# Patient Record
Sex: MALE | Race: BLACK OR AFRICAN AMERICAN | NOT HISPANIC OR LATINO | Employment: UNEMPLOYED | URBAN - METROPOLITAN AREA
[De-identification: names, ages, dates, MRNs, and addresses within clinical notes are randomized per-mention and may not be internally consistent; named-entity substitution may affect disease eponyms.]

---

## 2017-03-20 ENCOUNTER — HOSPITAL ENCOUNTER (EMERGENCY)
Facility: OTHER | Age: 42
Discharge: HOME | End: 2017-03-20
Attending: EMERGENCY MEDICINE

## 2017-03-20 VITALS
TEMPERATURE: 98 F | DIASTOLIC BLOOD PRESSURE: 65 MMHG | BODY MASS INDEX: 22.22 KG/M2 | SYSTOLIC BLOOD PRESSURE: 118 MMHG | RESPIRATION RATE: 16 BRPM | OXYGEN SATURATION: 99 % | HEART RATE: 89 BPM | WEIGHT: 150 LBS | HEIGHT: 69 IN

## 2017-03-20 DIAGNOSIS — Z20.2 POSSIBLE EXPOSURE TO STD: Primary | ICD-10-CM

## 2017-03-20 LAB — HIV1+2 IGG SERPL QL IA.RAPID: NEGATIVE

## 2017-03-20 PROCEDURE — 86701 HIV-1ANTIBODY: CPT

## 2017-03-20 PROCEDURE — 99283 EMERGENCY DEPT VISIT LOW MDM: CPT

## 2017-03-20 NOTE — ED AVS SNAPSHOT
"          [facility]  [address]  [facility] [address]               Neo Soriano   3/20/2017 12:09 PM   ED    Description:  Male : 1975   Department:  [facility]           Your Care was Coordinated By:     Provider Role From To    Yohan Yee MD Attending Provider 17 1210 17 1210    Yohan Yee MD Attending Provider 17 1230 --    Maria Ines Bailey PA-C Physician Assistant 17 1212 17 1213      Reason for Visit     Exposure to STD           Diagnoses this Visit        Comments    Possible exposure to STD    -  Primary       ED Disposition     None           To Do List           Follow-up Information     Follow up with [facility] In 2 days.    Contact information:    [address]  [facility] [address]        [facility]     [facility] -  Assistance  Registered nurses in the [facility] provide clinical advisement, health education, appointment booking, and other advisory services.  Call for this free service at [phone].             Medications           Message regarding Medications     Verify the changes and/or additions to your medication regime listed below are the same as discussed with your clinician today.  If any of these changes or additions are incorrect, please notify your healthcare provider.             Verify that the below list of medications is an accurate representation of the medications you are currently taking.  If none reported, the list may be blank. If incorrect, please contact your healthcare provider. Carry this list with you in case of emergency.                Clinical Reference Information           Your Vitals Were     BP Pulse Temp Resp Height Weight    145/78 (BP Location: Left arm, Patient Position: Sitting) 105 98.3 °F (36.8 °C) (Oral) 18 5' 8.5" (1.74 m) 68 kg (150 lb)    SpO2 BMI          "    100% 22.48 kg/m2         Allergies as of 3/20/2017     No Known Allergies      Immunizations Administered on Date of Encounter - 3/20/2017     None      ED Micro, Lab, POCT     Start Ordered       Status Ordering Provider    03/20/17 1231 03/20/17 1230  Rapid HIV  STAT      In process       ED Imaging Orders     None      Discharge References/Attachments     STD, IF YOU THINK YOU HAVE (ENGLISH)    STDS (SEXUALLY TRANSMITTED DISEASES), WHAT ARE (ENGLISH)    STDS, UNDERSTANDING (ENGLISH)    HIV AND AIDS, UNDERSTANDING (ENGLISH)      [facility] Sign-Up     Activating your [facility] account is as easy as 1-2-3!     1) Visit [email or web address], select Sign Up Now, enter this activation code and your date of birth, then select Next.  [email or web address]  Expires: 5/4/2017  1:03 PM      2) Create a username and password to use when you visit [facility] in the future and select a security question in case you lose your password and select Next.    3) Enter your e-mail address and click Sign Up!    Additional Information  If you have questions, please e-mail [email or web address] or call [phone] to talk to our [facility] staff. Remember, [facility] is NOT to be used for urgent needs. For medical emergencies, dial 911.         Smoking Cessation     If you would like to quit smoking:   You may be eligible for free services if you are a [place] resident and started smoking cigarettes before September 1, 1988.  Call the Smoking Cessation Trust (SCT) toll free at (151) 583-2507 or (319) 862-2861.   Call 2-759-QUIT-NOW if you do not meet the above criteria.             [facility] complies with applicable Federal civil rights laws and does not discriminate on the basis of race, color, national origin, age, disability, or sex.        Language Assistance Services     ATTENTION: Language assistance services are available, free of charge. Please call [phone].      ATENCIÓN: ciara Sanchez  disposición servicios gratuitos de asistencia lingüística. Llteto al [phone].     NEGAR Ý: N?u b?n nói Ti?ng Vi?t, có các d?ch v? h? tr? ngôn ng? mi?n phí dành cho b?n. G?i s? [phone].

## 2017-03-20 NOTE — ED TRIAGE NOTES
Pt states concerned about exposure to HIV - pt states person he has had relations with had medication for HIV - pt denies any symptoms of other STD's - has been with this person for the past 3-4 weeks

## 2017-03-20 NOTE — ED PROVIDER NOTES
Encounter Date: 3/20/2017    SCRIBE #1 NOTE: I, Leslie Parker, am scribing for, and in the presence of,  Dr. Navas. I have scribed the entire note.       History     Chief Complaint   Patient presents with    Exposure to STD     pt states he needs to be checked for STDs, denies symptoms.      Review of patient's allergies indicates:  No Known Allergies  HPI Comments: Time seen by provider: 12:32 PM    This is a 41 y.o. male who presents with complaint of exposure to STD. He reports incident occurred was about 3 weeks ago. The patient notes at onset he had rectal intercourse with someone he suspects has HIV as he saw medications at the individual's house. He would like to be tested for HIV and sexual transmitted illnesses. The patient denies any penile discharge, penile lesions, rectal lesion, burning with urination, abdominal pain, nausea, vomiting, fever or chills. He reports he was last tested for HIV sometime last year.     The history is provided by the patient.     History reviewed. No pertinent past medical history.  History reviewed. No pertinent surgical history.  History reviewed. No pertinent family history.  Social History   Substance Use Topics    Smoking status: Current Every Day Smoker    Smokeless tobacco: None    Alcohol use Yes     Review of Systems   Constitutional: Negative for chills and fever.   HENT: Negative for congestion and sore throat.    Eyes: Negative for redness and visual disturbance.   Respiratory: Negative for cough and shortness of breath.    Cardiovascular: Negative for chest pain and palpitations.   Gastrointestinal: Negative for abdominal pain, diarrhea, nausea and vomiting.   Genitourinary: Negative for dysuria.   Musculoskeletal: Negative for back pain.   Skin: Negative for rash.   Neurological: Negative for weakness and headaches.   Psychiatric/Behavioral: Negative for confusion.       Physical Exam   Initial Vitals   BP Pulse Resp Temp SpO2   03/20/17 1108 03/20/17  1108 03/20/17 1108 03/20/17 1108 03/20/17 1108   145/78 105 18 98.3 °F (36.8 °C) 100 %     Physical Exam    Nursing note and vitals reviewed.  Constitutional: He appears well-developed and well-nourished. He is not diaphoretic. No distress.   HENT:   Head: Normocephalic and atraumatic.   Right Ear: External ear normal.   Left Ear: External ear normal.   Oropharynx is clear and intact. Moist mucus membranes   Eyes: EOM are normal.   Conjunctivae clear, pink, and intact.   Neck: Normal range of motion. Neck supple.   Cardiovascular: Normal rate and regular rhythm. Exam reveals no gallop and no friction rub.    No murmur heard.  Normal S1/S2.   Pulmonary/Chest: He has no wheezes. He has no rhonchi. He has no rales.   Clear to auscultation bilaterally.    Abdominal: Soft. Bowel sounds are normal. There is no tenderness. There is no rebound and no guarding.   No audible bruits.   Musculoskeletal: Normal range of motion. He exhibits no edema or tenderness.   Lymphadenopathy:     He has no cervical adenopathy.   Neurological: He is alert and oriented to person, place, and time. He has normal strength.   Skin: Skin is warm and dry. No rash noted.   No skin tenting. No lesions.         ED Course   Procedures  Labs Reviewed   RAPID HIV             Medical Decision Making:   Clinical Tests:   Lab Tests: Reviewed and Ordered            Scribe Attestation:   Scribe #1: I performed the above scribed service and the documentation accurately describes the services I performed. I attest to the accuracy of the note.    Attending Attestation:           Physician Attestation for Scribe:  Physician Attestation Statement for Scribe #1: I, Dr. Navas, reviewed documentation, as scribed by Leslie Parker in my presence, and it is both accurate and complete.         Attending ED Notes:   Urgent evaluation a 41-year-old male with possible exposure to HIV.  The patient is afebrile, nontoxic-appearing with stable vital signs.  The patient  is extensively counseled on his diagnosis and treatment, discharged in good condition and directed follow-up his PCP in the next 24-48 hours.          ED Course     Clinical Impression:     1. Possible exposure to STD                Yohan Yee MD  03/20/17 4503

## 2017-03-24 ENCOUNTER — HOSPITAL ENCOUNTER (EMERGENCY)
Facility: OTHER | Age: 42
Discharge: HOME | End: 2017-03-24
Attending: EMERGENCY MEDICINE

## 2017-03-24 VITALS
RESPIRATION RATE: 16 BRPM | OXYGEN SATURATION: 100 % | DIASTOLIC BLOOD PRESSURE: 84 MMHG | SYSTOLIC BLOOD PRESSURE: 132 MMHG | HEART RATE: 78 BPM | WEIGHT: 150 LBS | TEMPERATURE: 98 F | BODY MASS INDEX: 22.73 KG/M2 | HEIGHT: 68 IN

## 2017-03-24 DIAGNOSIS — R36.9 PENILE DISCHARGE: Primary | ICD-10-CM

## 2017-03-24 LAB
C TRACH DNA SPEC QL NAA+PROBE: NOT DETECTED
N GONORRHOEA DNA SPEC QL NAA+PROBE: DETECTED

## 2017-03-24 PROCEDURE — 25000003 PHARM REV CODE 250: Performed by: EMERGENCY MEDICINE

## 2017-03-24 PROCEDURE — 96372 THER/PROPH/DIAG INJ SC/IM: CPT

## 2017-03-24 PROCEDURE — 87591 N.GONORRHOEAE DNA AMP PROB: CPT

## 2017-03-24 PROCEDURE — 63600175 PHARM REV CODE 636 W HCPCS: Performed by: EMERGENCY MEDICINE

## 2017-03-24 PROCEDURE — 99283 EMERGENCY DEPT VISIT LOW MDM: CPT | Mod: 25

## 2017-03-24 RX ORDER — AZITHROMYCIN 250 MG/1
1000 TABLET, FILM COATED ORAL
Status: COMPLETED | OUTPATIENT
Start: 2017-03-24 | End: 2017-03-24

## 2017-03-24 RX ORDER — CEFTRIAXONE 250 MG/1
250 INJECTION, POWDER, FOR SOLUTION INTRAMUSCULAR; INTRAVENOUS
Status: COMPLETED | OUTPATIENT
Start: 2017-03-24 | End: 2017-03-24

## 2017-03-24 RX ADMIN — AZITHROMYCIN 1000 MG: 250 TABLET, FILM COATED ORAL at 01:03

## 2017-03-24 RX ADMIN — CEFTRIAXONE SODIUM 250 MG: 250 INJECTION, POWDER, FOR SOLUTION INTRAMUSCULAR; INTRAVENOUS at 01:03

## 2017-03-24 NOTE — ED NOTES
Pt arrived to ED with c/o worsening penile burning which he was evaluated for at this facility 2 days ago. Pt reports that since being discharged, symptoms have worsened and he now has a yellow discharge.

## 2017-03-24 NOTE — ED PROVIDER NOTES
Encounter Date: 3/24/2017    SCRIBE #1 NOTE: I, Leslie Parker, am scribing for, and in the presence of,  Dr. Melissa. I have scribed the entire note.       History     Chief Complaint   Patient presents with    Penile Discharge     penile burning and yellow discharge; pt reports that he was seen here 2 days ago and told to come back if he had new symptoms and the discharge is new     Review of patient's allergies indicates:  No Known Allergies  HPI Comments: Time seen by provider: 1:40 AM    This is a 41 y.o. male who presents with complaint of penile discharge. He reports onset of symptoms was a few days ago. The patient notes he has yellowish discharge. He reports associated penile burning but denies any abdominal pain, urinary symptoms, nausea, vomiting, fever or chills. The patient states he was evaluated in the ED 4 days ago with concern for possible HIV exposure. He states he had HIV testing in the ED which was negative and instructed to follow up.     The history is provided by the patient.     History reviewed. No pertinent past medical history.  History reviewed. No pertinent surgical history.  History reviewed. No pertinent family history.  Social History   Substance Use Topics    Smoking status: Current Every Day Smoker    Smokeless tobacco: None    Alcohol use Yes      Comment: occassional     Review of Systems   Constitutional: Negative for chills and fever.   HENT: Negative for congestion and sore throat.    Eyes: Negative for redness and visual disturbance.   Respiratory: Negative for cough and shortness of breath.    Cardiovascular: Negative for chest pain and palpitations.   Gastrointestinal: Negative for abdominal pain, diarrhea, nausea and vomiting.   Genitourinary: Positive for discharge. Negative for dysuria.   Musculoskeletal: Negative for back pain.   Skin: Negative for rash.   Neurological: Negative for weakness and headaches.   Psychiatric/Behavioral: Negative for confusion.        Physical Exam   Initial Vitals   BP Pulse Resp Temp SpO2   03/24/17 0041 03/24/17 0041 03/24/17 0041 03/24/17 0041 03/24/17 0041   136/92 91 16 98.1 °F (36.7 °C) 100 %     Physical Exam    Nursing note and vitals reviewed.  Constitutional: He appears well-developed and well-nourished. He is not diaphoretic. No distress.   HENT:   Head: Normocephalic and atraumatic.   Right Ear: External ear normal.   Left Ear: External ear normal.   Eyes: Conjunctivae and EOM are normal.   Neck: Normal range of motion. Neck supple.   Cardiovascular: Intact distal pulses.   Abdominal: Soft. Bowel sounds are normal. There is no tenderness. There is no rebound and no guarding.   Genitourinary:   Genitourinary Comments: Greenish white discharge. No penile lesions. No scrotal swelling or tenderness. No inguinal tenderness or hernia   Musculoskeletal: Normal range of motion. He exhibits no edema or tenderness.   Lymphadenopathy:     He has no cervical adenopathy.   Neurological: He is alert and oriented to person, place, and time. He has normal strength.   Skin: Skin is warm and dry. No rash noted.         ED Course   Procedures  Labs Reviewed   C. TRACHOMATIS/N. GONORRHOEAE BY AMP DNA             Medical Decision Making:   Clinical Tests:   Lab Tests: Reviewed and Ordered  ED Management:  Patient presents complaining of penile discharge and a burning when he urinates.  He was here 2 days ago after reported exposure to STD.  He was HIV negative that time and discharged.  Now is having new symptoms.  Scribed above.  He does have a drip on examination.  Treated with ceftriaxone and azithromycin.  Urged follow-up STD clinic in 1 week for recheck.  No other concerning vital examination, no signs of ascending infection.    I did have an extensive talk regarding signs to return for and need for follow up. Patient expressed understanding and will monitor symptoms closely and follow-up as needed.    RUBIN Melissa M.D.  03/24/2017   4:45 AM              Scribe Attestation:   Scribe #1: I performed the above scribed service and the documentation accurately describes the services I performed. I attest to the accuracy of the note.    Attending Attestation:           Physician Attestation for Scribe:  Physician Attestation Statement for Scribe #1: I, Dr. Melissa, reviewed documentation, as scribed by Leslie Parker in my presence, and it is both accurate and complete.                 ED Course     Clinical Impression:     1. Penile discharge              Mt Melissa MD  03/24/17 0445

## 2017-03-24 NOTE — ED AVS SNAPSHOT
[facility]  [address]  [facility] [address]               Neo Soriano   3/24/2017  1:06 AM   ED    Description:  Male : 1975   Department:  [facility]           Your Care was Coordinated By:     Provider Role From To    Mt Melissa MD Attending Provider 17 0106 --      Reason for Visit     Penile Discharge           Diagnoses this Visit        Comments    Penile discharge    -  Primary       ED Disposition     None           To Do List           Follow-up Information     Follow up with [facility]. Schedule an appointment as soon as possible for a visit in 1 week.    Why:  for recheck        Follow up with [facility].    Specialty:  Emergency Medicine    Contact information:    [address]  [facility] [id number] [phone]      [facility]     [facility] Care [facility] -  Assistance  Registered nurses in the [facility] provide clinical advisement, health education, appointment booking, and other advisory services.  Call for this free service at [phone].             Medications           Message regarding Medications     Verify the changes and/or additions to your medication regime listed below are the same as discussed with your clinician today.  If any of these changes or additions are incorrect, please notify your healthcare provider.        These medications were administered today        Dose Freq    cefTRIAXone injection 250 mg 250 mg ED 1 Time    Sig: Inject 250 mg into the muscle ED 1 Time.    Class: Normal    Route: Intramuscular    azithromycin tablet 1,000 mg 1,000 mg ED 1 Time    Sig: Take 4 tablets (1,000 mg total) by mouth ED 1 Time.    Class: Normal    Route: Oral           Verify that the below list of medications is an accurate representation of the medications you are currently taking.  If none reported, the list may be blank. If  "incorrect, please contact your healthcare provider. Carry this list with you in case of emergency.           Current Medications            Clinical Reference Information           Your Vitals Were     BP Pulse Temp Resp Height Weight    136/92 (BP Location: Left arm, Patient Position: Sitting) 91 98.1 °F (36.7 °C) (Oral) 16 5' 8" (1.727 m) 68 kg (150 lb)    SpO2 BMI             100% 22.81 kg/m2         Allergies as of 3/24/2017     No Known Allergies      Immunizations Administered on Date of Encounter - 3/24/2017     None      ED Micro, Lab, POCT     Start Ordered       Status Ordering Provider    03/24/17 0116 03/24/17 0115  C. trachomatis/N. gonorrhoeae by AMP DNA Urine  STAT      In process     03/24/17 0108 03/24/17 0107    STAT,   Status:  Canceled      Canceled       ED Imaging Orders     None      Discharge References/Attachments     URETHRITIS, GONORRHEA OR CHLAMYDIA (ADULT MALE) (ENGLISH)      [facility] Sign-Up     Activating your [facility] account is as easy as 1-2-3!     1) Visit [email or web address], select Sign Up Now, enter this activation code and your date of birth, then select Next.  [email or web address]  Expires: 5/4/2017  1:03 PM      2) Create a username and password to use when you visit [facility] in the future and select a security question in case you lose your password and select Next.    3) Enter your e-mail address and click Sign Up!    Additional Information  If you have questions, please e-mail [email or web address] or call [phone] to talk to our [facility] staff. Remember, [facility] is NOT to be used for urgent needs. For medical emergencies, dial 911.         Smoking Cessation     If you would like to quit smoking:   You may be eligible for free services if you are a [place] resident and started smoking cigarettes before September 1, 1988.  Call the Smoking Cessation Trust (SCT) toll free at (667) 350-2359 or (474) 786-0307.   Call 8-064-QUIT-NOW if you do not meet the above " criteria.             [facility] complies with applicable Federal civil rights laws and does not discriminate on the basis of race, color, national origin, age, disability, or sex.        Language Assistance Services     ATTENTION: Language assistance services are available, free of charge. Please call [phone].      ATENCIÓN: Si habla morelia, tiene a pacheco disposición servicios gratuitos de asistencia lingüística. Llame al [phone].     CHÚ Ý: N?u b?n nói Ti?ng Vi?t, có các d?ch v? h? tr? ngôn ng? mi?n phí dành cho b?n. G?i s? [phone].

## 2017-12-01 ENCOUNTER — HOSPITAL ENCOUNTER (EMERGENCY)
Facility: OTHER | Age: 42
Discharge: HOME | End: 2017-12-01
Attending: EMERGENCY MEDICINE
Payer: MEDICAID

## 2017-12-01 VITALS
RESPIRATION RATE: 14 BRPM | OXYGEN SATURATION: 99 % | WEIGHT: 147 LBS | HEIGHT: 69 IN | HEART RATE: 84 BPM | BODY MASS INDEX: 21.77 KG/M2 | TEMPERATURE: 98 F | SYSTOLIC BLOOD PRESSURE: 121 MMHG | DIASTOLIC BLOOD PRESSURE: 64 MMHG

## 2017-12-01 DIAGNOSIS — R59.0 INGUINAL LYMPHADENOPATHY: Primary | ICD-10-CM

## 2017-12-01 LAB
ALBUMIN SERPL BCP-MCNC: 3 G/DL
ALP SERPL-CCNC: 512 U/L
ALT SERPL W/O P-5'-P-CCNC: 426 U/L
ANION GAP SERPL CALC-SCNC: 7 MMOL/L
ANISOCYTOSIS BLD QL SMEAR: SLIGHT
AST SERPL-CCNC: 239 U/L
BASOPHILS # BLD AUTO: ABNORMAL K/UL
BASOPHILS NFR BLD: 0 %
BILIRUB SERPL-MCNC: 0.9 MG/DL
BILIRUB UR QL STRIP: NEGATIVE
BUN SERPL-MCNC: 9 MG/DL
CALCIUM SERPL-MCNC: 8.8 MG/DL
CHLORIDE SERPL-SCNC: 106 MMOL/L
CLARITY UR: CLEAR
CO2 SERPL-SCNC: 24 MMOL/L
COLOR UR: YELLOW
CREAT SERPL-MCNC: 0.8 MG/DL
DIFFERENTIAL METHOD: ABNORMAL
EOSINOPHIL # BLD AUTO: ABNORMAL K/UL
EOSINOPHIL NFR BLD: 0 %
ERYTHROCYTE [DISTWIDTH] IN BLOOD BY AUTOMATED COUNT: 15 %
EST. GFR  (AFRICAN AMERICAN): >60 ML/MIN/1.73 M^2
EST. GFR  (NON AFRICAN AMERICAN): >60 ML/MIN/1.73 M^2
GIANT PLATELETS BLD QL SMEAR: PRESENT
GLUCOSE SERPL-MCNC: 113 MG/DL
GLUCOSE UR QL STRIP: NEGATIVE
HCT VFR BLD AUTO: 41 %
HGB BLD-MCNC: 14.1 G/DL
HGB UR QL STRIP: NEGATIVE
HIV1+2 IGG SERPL QL IA.RAPID: NEGATIVE
KETONES UR QL STRIP: NEGATIVE
LEUKOCYTE ESTERASE UR QL STRIP: NEGATIVE
LYMPHOCYTES # BLD AUTO: ABNORMAL K/UL
LYMPHOCYTES NFR BLD: 13 %
MCH RBC QN AUTO: 31.8 PG
MCHC RBC AUTO-ENTMCNC: 34.4 G/DL
MCV RBC AUTO: 93 FL
MONOCYTES # BLD AUTO: ABNORMAL K/UL
MONOCYTES NFR BLD: 14 %
NEUTROPHILS NFR BLD: 72 %
NEUTS BAND NFR BLD MANUAL: 1 %
NITRITE UR QL STRIP: NEGATIVE
PH UR STRIP: 6 [PH] (ref 5–8)
PLATELET # BLD AUTO: 212 K/UL
PLATELET BLD QL SMEAR: ABNORMAL
PMV BLD AUTO: 9.8 FL
POLYCHROMASIA BLD QL SMEAR: ABNORMAL
POTASSIUM SERPL-SCNC: 3.9 MMOL/L
PROT SERPL-MCNC: 7.7 G/DL
PROT UR QL STRIP: NEGATIVE
RBC # BLD AUTO: 4.43 M/UL
SODIUM SERPL-SCNC: 137 MMOL/L
SP GR UR STRIP: 1.01 (ref 1–1.03)
URN SPEC COLLECT METH UR: NORMAL
UROBILINOGEN UR STRIP-ACNC: 1 EU/DL
WBC # BLD AUTO: 16.59 K/UL

## 2017-12-01 PROCEDURE — 85007 BL SMEAR W/DIFF WBC COUNT: CPT

## 2017-12-01 PROCEDURE — 25000003 PHARM REV CODE 250: Performed by: EMERGENCY MEDICINE

## 2017-12-01 PROCEDURE — 80053 COMPREHEN METABOLIC PANEL: CPT

## 2017-12-01 PROCEDURE — 86703 HIV-1/HIV-2 1 RESULT ANTBDY: CPT

## 2017-12-01 PROCEDURE — 99284 EMERGENCY DEPT VISIT MOD MDM: CPT

## 2017-12-01 PROCEDURE — 86703 HIV-1/HIV-2 1 RESULT ANTBDY: CPT | Mod: 91

## 2017-12-01 PROCEDURE — 36415 COLL VENOUS BLD VENIPUNCTURE: CPT

## 2017-12-01 PROCEDURE — 85027 COMPLETE CBC AUTOMATED: CPT

## 2017-12-01 PROCEDURE — 81003 URINALYSIS AUTO W/O SCOPE: CPT

## 2017-12-01 RX ORDER — HYDROCODONE BITARTRATE AND ACETAMINOPHEN 5; 325 MG/1; MG/1
2 TABLET ORAL
Status: COMPLETED | OUTPATIENT
Start: 2017-12-01 | End: 2017-12-01

## 2017-12-01 RX ORDER — TRAMADOL HYDROCHLORIDE 50 MG/1
50 TABLET ORAL EVERY 6 HOURS PRN
Qty: 15 TABLET | Refills: 0 | Status: SHIPPED | OUTPATIENT
Start: 2017-12-01 | End: 2017-12-11

## 2017-12-01 RX ORDER — IBUPROFEN 800 MG/1
800 TABLET ORAL EVERY 6 HOURS PRN
Qty: 30 TABLET | Refills: 0 | Status: SHIPPED | OUTPATIENT
Start: 2017-12-01 | End: 2018-01-08

## 2017-12-01 RX ORDER — CEPHALEXIN 500 MG/1
500 CAPSULE ORAL 4 TIMES DAILY
Qty: 28 CAPSULE | Refills: 0 | Status: SHIPPED | OUTPATIENT
Start: 2017-12-01 | End: 2017-12-08

## 2017-12-01 RX ADMIN — HYDROCODONE BITARTRATE AND ACETAMINOPHEN 2 TABLET: 5; 325 TABLET ORAL at 12:12

## 2017-12-01 NOTE — ED NOTES
Pt rounding complete.  Pain 0/10.  Restroom and comfort needs addressed.  Pt updated on plan of care.  Call light within reach.  Will continue to monitor.  Visitor at bedside.

## 2017-12-01 NOTE — ED NOTES
Pt rounding complete.  Pain 0/10.  Restroom and comfort needs addressed.  Pt updated on plan of care.  Call light within reach.  Will continue to monitor.

## 2017-12-01 NOTE — ED NOTES
Pt rounding complete. Pt updated on POC. Pt verbalized understanding.  Pt denies  needs at the moment. Pt does not appear to be in acute distress. Respirations are even and unlabored.  Bed is locked and in lowest position with side rails up x1.  Call light is within reach. Will continue to monitor.

## 2017-12-01 NOTE — ED NOTES
Pt rounding complete.  Pain 6/10, not requesting medication at this time.  Restroom and comfort needs addressed.  Pt updated on plan of care.  Call light within reach.  Will continue to monitor.  Family at bedside.

## 2017-12-01 NOTE — ED NOTES
Pt rounding complete. Pt reports complete relief from pain.  Pt denies any pain or needs at the moment. Pt does not appear to be in acute distress. Respirations are even and unlabored. VSS. Bed is locked and in lowest position with side rails up x2. Call light is within reach. Will continue to monitor.

## 2017-12-01 NOTE — ED PROVIDER NOTES
"Encounter Date: 12/1/2017    SCRIBE #1 NOTE: I, Vicente Pimentel, am scribing for, and in the presence of, Dr. Soto.       History     Chief Complaint   Patient presents with    Groin Swelling     bilateral groing pain and swelling x 2 days; denies urinary complaints, penile/testicular discharge or pain, or N/V/D     11:43 AM    Patient is a 41 y.o. male who presents to the ED with complaint of groin swelling and pain. Pain and swelling is located in the bilateral inguinal region, and is worse on the left side. He reports onset of symptoms three days ago. He reports swelling has progressively increased on both sides since onset. He noticed pain for the first time while lying in bed. Pain is worse with standing and coughing. He denies other abdominal pain, testicular pain or swelling, penile pain or discharge, urinary symptoms, fever, chills, nausea, vomiting, diarrhea, or constipation. He reports "pushing a washing machine" two weeks ago, but denies any symptoms until three days ago. He denies any other strenuous activity or heavy lifting. He states he has never had similar symptoms in the past. He has no additional complaints.      The history is provided by the patient.     Review of patient's allergies indicates:  No Known Allergies  History reviewed. No pertinent past medical history.  History reviewed. No pertinent surgical history.  No family history on file.  Social History   Substance Use Topics    Smoking status: Current Every Day Smoker    Smokeless tobacco: Not on file    Alcohol use Yes      Comment: occassional     Review of Systems   Constitutional: Negative for fever.   HENT: Negative for congestion and sore throat.    Eyes: Negative for visual disturbance.   Respiratory: Negative for shortness of breath.    Cardiovascular: Negative for chest pain.   Gastrointestinal: Positive for abdominal pain. Negative for constipation, diarrhea, nausea and vomiting.   Genitourinary: Negative for dysuria, " frequency, hematuria, penile pain, penile swelling, scrotal swelling and testicular pain.        Positive for groin pain and swelling.   Musculoskeletal: Negative for back pain.   Skin: Negative for rash.   Neurological: Negative for weakness.   Hematological: Does not bruise/bleed easily.   Psychiatric/Behavioral: Negative for confusion.       Physical Exam     Initial Vitals [12/01/17 1047]   BP Pulse Resp Temp SpO2   117/65 89 14 98.6 °F (37 °C) 99 %      MAP       82.33         Physical Exam    Nursing note and vitals reviewed.  Constitutional: He appears well-developed and well-nourished.   Thin male. Uncomfortable appearance.   HENT:   Head: Normocephalic and atraumatic.   Eyes: Conjunctivae and EOM are normal.   Neck: Normal range of motion. Neck supple.   Cardiovascular: Intact distal pulses.   Pulmonary/Chest: No respiratory distress.   Abdominal: Soft. There is no tenderness.   Genitourinary:   Genitourinary Comments: Scrotum and testes normal in appearance and non-tender. 3 cm swelling in the right inguinal region, tender. 6 x 8 cm swelling in the left inguinal region, not boggy or fluctuant. Increased pain with coughing, but no bulging. (male chaperone present).   Musculoskeletal: Normal range of motion.   No distal edema.   Neurological: He is alert and oriented to person, place, and time. He has normal strength.   Skin: Skin is warm and dry. Capillary refill takes less than 2 seconds. No erythema.   Bilateral Le: normal in appearance, no erythema.   Psychiatric: He has a normal mood and affect. His behavior is normal. Judgment and thought content normal.         ED Course   Procedures  Labs Reviewed   CBC W/ AUTO DIFFERENTIAL - Abnormal; Notable for the following:        Result Value    WBC 16.59 (*)     RBC 4.43 (*)     MCH 31.8 (*)     RDW 15.0 (*)     Lymph% 13.0 (*)     All other components within normal limits   COMPREHENSIVE METABOLIC PANEL - Abnormal; Notable for the following:     Glucose 113  (*)     Albumin 3.0 (*)     Alkaline Phosphatase 512 (*)      (*)      (*)     Anion Gap 7 (*)     All other components within normal limits   URINALYSIS   RAPID HIV   HIV 1 / 2 ANTIBODY     Imaging Results          US Soft Tissue Misc (Final result)  Result time 12/01/17 13:10:35   Procedure changed from US Scrotum And Testicles     Final result by Wagner Whitman MD (12/01/17 13:10:35)                 Impression:      Bilateral inguinal adenopathy. Subcutaneous soft tissue edema is noted in the left inguinal region.      Electronically signed by: WAGNER WHITMAN MD  Date:     12/01/17  Time:    13:10              Narrative:    Sonographic evaluation of the inguinal regions was performed bilaterally. There is bilateral inguinal adenopathy identified with numerous enlarged lymph nodes noted bilaterally. The largest lymph node on the right measures approximately 3.9 x 1.1 x 2.0 cm in size and the largest lymph node on the left measures 5.9 x 1.6 x 2.1 cm. There is subcutaneous soft tissue edema noted in the left inguinal region.                                      Medical Decision Making:   Clinical Tests:   Lab Tests: Ordered and Reviewed  Radiological Study: Reviewed and Ordered            Scribe Attestation:   Scribe #1: I performed the above scribed service and the documentation accurately describes the services I performed. I attest to the accuracy of the note.    Attending Attestation:           Physician Attestation for Scribe:  Physician Attestation Statement for Scribe #1: I, Dr. Soto, reviewed documentation, as scribed by Vicente Pimentel in my presence, and it is both accurate and complete.                 ED Course     healthy male presents with 3 days of progressive swelling of bilateral inguinal region.  Larger on the left than the right no genitourinary symptoms but has been seen here in the past for urethritis.  The initial concern was for entrapped inguinal hernia however ultrasound  shows large bilateral lymphadenopathy.  Would blood cell count shows a mild leukocytosis but not incredibly high nor are there significant abnormality of the differential.  Remainder laboratory studies are unremarkable.  HIV is negative.  Based on the size of the lymph nodes we will treat with course of antibiotics for possible lymphadenitis.  Anti-inflammatories, analgesics.  Encouraged follow-up with primary care, especially if these do not improve over the next several days as expected  Clinical Impression:     1. Inguinal lymphadenopathy                             Yusuf Soto II, MD  12/01/17 7113

## 2017-12-01 NOTE — ED NOTES
Pt presents to the ed with c/o bilateral groin swelling x2 days. Pt has large egg sized lumps in the groin area. Pt reports pain 9/10. Pt denies and fever, chill, N/V, penile discharge or urinary problems. Pt does not appear to be in acute distress. Respirations are even and unlabored. Bed is locked and in lowest position. Call light is within reach.

## 2017-12-04 LAB — HIV 1+2 AB+HIV1 P24 AG SERPL QL IA: NEGATIVE

## 2018-01-08 ENCOUNTER — HOSPITAL ENCOUNTER (EMERGENCY)
Facility: OTHER | Age: 43
Discharge: HOME | End: 2018-01-08
Attending: EMERGENCY MEDICINE
Payer: MEDICAID

## 2018-01-08 VITALS
WEIGHT: 147 LBS | DIASTOLIC BLOOD PRESSURE: 76 MMHG | HEIGHT: 71 IN | SYSTOLIC BLOOD PRESSURE: 113 MMHG | HEART RATE: 95 BPM | OXYGEN SATURATION: 97 % | RESPIRATION RATE: 17 BRPM | TEMPERATURE: 103 F | BODY MASS INDEX: 20.58 KG/M2

## 2018-01-08 DIAGNOSIS — J10.1 INFLUENZA A: Primary | ICD-10-CM

## 2018-01-08 LAB
FLUAV AG SPEC QL IA: POSITIVE
FLUBV AG SPEC QL IA: NEGATIVE
SPECIMEN SOURCE: ABNORMAL

## 2018-01-08 PROCEDURE — 99283 EMERGENCY DEPT VISIT LOW MDM: CPT

## 2018-01-08 PROCEDURE — 25000003 PHARM REV CODE 250: Performed by: PHYSICIAN ASSISTANT

## 2018-01-08 PROCEDURE — 87400 INFLUENZA A/B EACH AG IA: CPT

## 2018-01-08 RX ORDER — ACETAMINOPHEN 500 MG
1000 TABLET ORAL
Status: COMPLETED | OUTPATIENT
Start: 2018-01-08 | End: 2018-01-08

## 2018-01-08 RX ORDER — IBUPROFEN 800 MG/1
800 TABLET ORAL EVERY 6 HOURS PRN
Qty: 20 TABLET | Refills: 0 | Status: SHIPPED | OUTPATIENT
Start: 2018-01-08 | End: 2023-01-01 | Stop reason: CLARIF

## 2018-01-08 RX ORDER — IBUPROFEN 600 MG/1
600 TABLET ORAL
Status: COMPLETED | OUTPATIENT
Start: 2018-01-08 | End: 2018-01-08

## 2018-01-08 RX ORDER — FLUTICASONE PROPIONATE 50 MCG
1 SPRAY, SUSPENSION (ML) NASAL 2 TIMES DAILY PRN
Qty: 15 G | Refills: 0 | Status: SHIPPED | OUTPATIENT
Start: 2018-01-08 | End: 2023-01-01 | Stop reason: CLARIF

## 2018-01-08 RX ORDER — OSELTAMIVIR PHOSPHATE 75 MG/1
75 CAPSULE ORAL 2 TIMES DAILY
Qty: 10 CAPSULE | Refills: 0 | Status: SHIPPED | OUTPATIENT
Start: 2018-01-08 | End: 2018-01-13

## 2018-01-08 RX ORDER — ONDANSETRON 4 MG/1
4 TABLET, ORALLY DISINTEGRATING ORAL EVERY 8 HOURS PRN
Qty: 12 TABLET | Refills: 0 | Status: SHIPPED | OUTPATIENT
Start: 2018-01-08 | End: 2023-01-01 | Stop reason: CLARIF

## 2018-01-08 RX ORDER — BENZONATATE 200 MG/1
200 CAPSULE ORAL 3 TIMES DAILY PRN
Qty: 20 CAPSULE | Refills: 0 | Status: SHIPPED | OUTPATIENT
Start: 2018-01-08 | End: 2018-01-18

## 2018-01-08 RX ORDER — DEXTROMETHORPHAN HYDROBROMIDE, GUAIFENESIN 5; 100 MG/5ML; MG/5ML
650 LIQUID ORAL EVERY 8 HOURS PRN
Qty: 20 TABLET | Refills: 0 | Status: SHIPPED | OUTPATIENT
Start: 2018-01-08 | End: 2023-01-01 | Stop reason: CLARIF

## 2018-01-08 RX ADMIN — ACETAMINOPHEN 1000 MG: 500 TABLET ORAL at 07:01

## 2018-01-08 RX ADMIN — IBUPROFEN 600 MG: 600 TABLET, FILM COATED ORAL at 08:01

## 2018-01-09 NOTE — ED PROVIDER NOTES
Encounter Date: 1/8/2018       History     Chief Complaint   Patient presents with    Generalized Body Aches     Began last night with body aches especially lower body. Denies n/v/d. Fever in triage.  Last time he took ibuprofen was this mrdalila     42-year-old male with no significant past medical history presents emergency department with complaints of generalized body aches, fever, chills, cough and congestion.  He states his symptoms started last night while at work.  He denies nausea, vomiting, diarrhea or abdominal pain.  He treated this morning with ibuprofen with little to no relief.  He does admit to being a current every day smoker.      The history is provided by the patient and a friend.     Review of patient's allergies indicates:  No Known Allergies  History reviewed. No pertinent past medical history.  History reviewed. No pertinent surgical history.  History reviewed. No pertinent family history.  Social History   Substance Use Topics    Smoking status: Current Every Day Smoker    Smokeless tobacco: Never Used    Alcohol use Yes      Comment: occassional     Review of Systems   Constitutional: Positive for chills and fever.   HENT: Positive for congestion and rhinorrhea. Negative for sore throat and trouble swallowing.    Respiratory: Positive for cough. Negative for shortness of breath.    Cardiovascular: Negative for chest pain.   Gastrointestinal: Negative for abdominal pain, diarrhea, nausea and vomiting.   Genitourinary: Negative for dysuria.   Musculoskeletal: Positive for arthralgias and myalgias. Negative for back pain, neck pain and neck stiffness.   Skin: Negative for rash.   Neurological: Negative for dizziness, syncope, weakness, light-headedness and numbness.   Hematological: Does not bruise/bleed easily.   Psychiatric/Behavioral: Negative for confusion.       Physical Exam     Initial Vitals [01/08/18 1851]   BP Pulse Resp Temp SpO2   113/76 95 17 (!) 103.1 °F (39.5 °C) 97 %       MAP       88.33         Physical Exam    ED Course   Procedures  Labs Reviewed   INFLUENZA A AND B ANTIGEN - Abnormal; Notable for the following:        Result Value    Influenza A Ag, EIA Positive (*)     All other components within normal limits             Medical Decision Making:   History:   I obtained history from: someone other than patient.       <> Summary of History: friend  Old Medical Records: I decided to obtain old medical records.  Initial Assessment:   42-year-old male with complaints consistent with influenza and fever.  Febrile on arrival to the emergency department.  Vital signs otherwise benign.  Exam documented above.  Concerns for influenza.  Clinical Tests:   Lab Tests: Ordered and Reviewed  ED Management:  Rapid flu obtained from triage and positive for influenza a.  He was administered Tylenol in the emergency department.  He is stable will be discharged home with prescription for Tamiflu at the symptoms started yesterday.  Also discharge home with prescriptions for symptomatic treatment and care instructions.  He is to follow-up with his doctor in the next 48 hours or return for any worsening signs or symptoms.  Given information for primary care he does not have one.  This patient was discussed with the attending physician who agrees with treatment plan.  He is urged to rest and hydration.  He states understanding.  Other:   I have discussed this case with another health care provider.       <> Summary of the Discussion: Demetria  This note was created using Dragon Medical dictation.  There may be typographical errors secondary to dictation.                     ED Course      1. Influenza A          Disposition:   Disposition: Discharged  Condition: Stable                        Demi Seay PA-C  01/08/18 2047

## 2018-01-09 NOTE — ED TRIAGE NOTES
"Pt CO "lower body aches", states the pain starts in his lower back, and radiates to both hips, and down both legs. The pain is described as pins and needles.   "

## 2018-01-11 ENCOUNTER — HOSPITAL ENCOUNTER (EMERGENCY)
Facility: OTHER | Age: 43
Discharge: HOME | End: 2018-01-11
Attending: EMERGENCY MEDICINE
Payer: MEDICAID

## 2018-01-11 VITALS
HEART RATE: 76 BPM | RESPIRATION RATE: 16 BRPM | HEIGHT: 69 IN | WEIGHT: 157 LBS | SYSTOLIC BLOOD PRESSURE: 125 MMHG | BODY MASS INDEX: 23.25 KG/M2 | OXYGEN SATURATION: 99 % | TEMPERATURE: 98 F | DIASTOLIC BLOOD PRESSURE: 66 MMHG

## 2018-01-11 DIAGNOSIS — J10.1 INFLUENZA A: Primary | ICD-10-CM

## 2018-01-11 PROCEDURE — 99282 EMERGENCY DEPT VISIT SF MDM: CPT

## 2018-01-12 NOTE — ED PROVIDER NOTES
"Encounter Date: 1/11/2018    SCRIBE #1 NOTE: I, Hortensia Sanchez, am scribing for, and in the presence of, Dr. Melissa.       History     Chief Complaint   Patient presents with    Letter for School/Work     " I can't go back to work yet. I need another work note. The work note they gave me said to go back today". + flu like symptoms.      Time seen by provider: 6:25 PM    This is a 42 y.o. male who presents with complaint of work note request. Patient states that he was scheduled to go back today but is currently feeling light-headed. Light-headedness is described as constant. He denies fever, chills, nausea, vomiting, abdominal pain, diarrhea, constipation, dizziness, headache, syncope, chest pain, or shortness of breath. The patient says he does not currently have PCP established. He has no additional complaints at this time.      The history is provided by the patient.     Review of patient's allergies indicates:  No Known Allergies  No past medical history on file.  No past surgical history on file.  No family history on file.  Social History   Substance Use Topics    Smoking status: Current Every Day Smoker    Smokeless tobacco: Never Used    Alcohol use Yes      Comment: occassional     Review of Systems   Constitutional: Negative for chills and fever.   Respiratory: Negative for shortness of breath.    Cardiovascular: Negative for chest pain.   Gastrointestinal: Negative for abdominal pain, constipation, diarrhea, nausea and vomiting.   Skin: Negative for rash.   Neurological: Positive for light-headedness. Negative for dizziness, syncope and headaches.       Physical Exam     Initial Vitals [01/11/18 1704]   BP Pulse Resp Temp SpO2   125/66 76 16 98.3 °F (36.8 °C) 99 %      MAP       85.67         Physical Exam    Nursing note and vitals reviewed.  Constitutional: He appears well-developed and well-nourished. He is not diaphoretic. No distress.   HENT:   Head: Normocephalic and atraumatic.   Eyes: EOM are " normal. Pupils are equal, round, and reactive to light. No scleral icterus.   Neck: Normal range of motion. Neck supple.   Cardiovascular: Normal rate, regular rhythm and normal heart sounds. Exam reveals no gallop and no friction rub.    No murmur heard.  Pulmonary/Chest: Breath sounds normal. No respiratory distress. He has no wheezes. He has no rhonchi. He has no rales.   Abdominal: Soft. Bowel sounds are normal. He exhibits no distension. There is no tenderness. There is no rebound and no guarding.   Musculoskeletal: Normal range of motion. He exhibits no edema or tenderness.   Neurological: He is alert and oriented to person, place, and time.   Skin: Skin is warm and dry. Capillary refill takes less than 2 seconds. No rash and no abscess noted. No erythema. No pallor.   Psychiatric: He has a normal mood and affect. His behavior is normal. Judgment and thought content normal.         ED Course   Procedures  Labs Reviewed - No data to display          Medical Decision Making:   ED Management:  Patient with persistent systemic symptoms from his previous diagnosis influenza A.  I have reviewed his chart.  Has normal vitals today.  Normal exam.  Only here because he needs a work note because he still does not ready to go back.    I did have an extensive talk regarding signs to return for and need for follow up. Patient expressed understanding and will monitor symptoms closely and follow-up as needed.    RUBIN Melissa M.D.  01/11/2018  11:20 PM              Scribe Attestation:   Scribe #1: I performed the above scribed service and the documentation accurately describes the services I performed. I attest to the accuracy of the note.    Attending Attestation:           Physician Attestation for Scribe:  Physician Attestation Statement for Scribe #1: I, Dr. Melissa, reviewed documentation, as scribed by Hortensia Sanchez in my presence, and it is both accurate and complete.                 ED Course      Clinical  Impression:     1. Influenza A                                 Mt Melissa MD  01/11/18 0183

## 2018-01-12 NOTE — ED NOTES
Two patient identifiers have been checked and are correct.      Appearance: Pt awake, alert & oriented to person, place & time. Pt in no acute distress at present time. Pt is clean and well groomed with clothes appropriately fastened. + sore throat, dry non productive cough  Skin: Skin warm, dry & intact. Color consistent with ethnicity. Mucous membranes moist. No breakdown or brusing noted.   Musculoskeletal: Patient moving all extremities well, no obvious swelling or deformities noted.   Respiratory: Respirations spontaneous, even, and non-labored. Visible chest rise noted. Airway is open and patent. No accessory muscle use noted.   Neurologic: Sensation is intact. Speech is clear and appropriate. Eyes open spontaneously, behavior appropriate to situation, follows commands, facial expression symmetrical, bilateral hand grasp equal and even, purposeful motor response noted.  Cardiac: All peripheral pulses present. No Bilateral lower extremity edema. Cap refill is <3 seconds.

## 2018-01-17 ENCOUNTER — HOSPITAL ENCOUNTER (EMERGENCY)
Facility: HOSPITAL | Age: 43
Discharge: HOME | End: 2018-01-17
Attending: EMERGENCY MEDICINE
Payer: MEDICAID

## 2018-01-17 VITALS
RESPIRATION RATE: 18 BRPM | HEIGHT: 68 IN | OXYGEN SATURATION: 96 % | WEIGHT: 147 LBS | DIASTOLIC BLOOD PRESSURE: 66 MMHG | SYSTOLIC BLOOD PRESSURE: 116 MMHG | TEMPERATURE: 99 F | HEART RATE: 91 BPM | BODY MASS INDEX: 22.28 KG/M2

## 2018-01-17 DIAGNOSIS — J11.1 INFLUENZA: Primary | ICD-10-CM

## 2018-01-17 LAB
BASOPHILS # BLD AUTO: 0.03 K/UL
BASOPHILS NFR BLD: 0.2 %
BUN SERPL-MCNC: 6 MG/DL (ref 6–30)
CHLORIDE SERPL-SCNC: 102 MMOL/L (ref 95–110)
CREAT SERPL-MCNC: 0.9 MG/DL (ref 0.5–1.4)
DIFFERENTIAL METHOD: ABNORMAL
EOSINOPHIL # BLD AUTO: 0.1 K/UL
EOSINOPHIL NFR BLD: 0.4 %
ERYTHROCYTE [DISTWIDTH] IN BLOOD BY AUTOMATED COUNT: 13.9 %
GLUCOSE SERPL-MCNC: 90 MG/DL (ref 70–110)
HCT VFR BLD AUTO: 43.5 %
HCT VFR BLD CALC: 46 %PCV (ref 36–54)
HGB BLD-MCNC: 14.6 G/DL
IMM GRANULOCYTES # BLD AUTO: 0.06 K/UL
IMM GRANULOCYTES NFR BLD AUTO: 0.4 %
LYMPHOCYTES # BLD AUTO: 2.2 K/UL
LYMPHOCYTES NFR BLD: 14.2 %
MCH RBC QN AUTO: 31.1 PG
MCHC RBC AUTO-ENTMCNC: 33.6 G/DL
MCV RBC AUTO: 93 FL
MONOCYTES # BLD AUTO: 1.6 K/UL
MONOCYTES NFR BLD: 10.4 %
NEUTROPHILS # BLD AUTO: 11.7 K/UL
NEUTROPHILS NFR BLD: 74.4 %
NRBC BLD-RTO: 0 /100 WBC
PLATELET # BLD AUTO: 323 K/UL
PMV BLD AUTO: 9.5 FL
POC IONIZED CALCIUM: 1.15 MMOL/L (ref 1.06–1.42)
POC TCO2 (MEASURED): 29 MMOL/L (ref 23–29)
POTASSIUM BLD-SCNC: 3.7 MMOL/L (ref 3.5–5.1)
RBC # BLD AUTO: 4.69 M/UL
SAMPLE: NORMAL
SODIUM BLD-SCNC: 141 MMOL/L (ref 136–145)
WBC # BLD AUTO: 15.74 K/UL

## 2018-01-17 PROCEDURE — 25000003 PHARM REV CODE 250: Performed by: PHYSICIAN ASSISTANT

## 2018-01-17 PROCEDURE — 99283 EMERGENCY DEPT VISIT LOW MDM: CPT | Mod: ,,, | Performed by: EMERGENCY MEDICINE

## 2018-01-17 PROCEDURE — 99283 EMERGENCY DEPT VISIT LOW MDM: CPT

## 2018-01-17 PROCEDURE — 85025 COMPLETE CBC W/AUTO DIFF WBC: CPT

## 2018-01-17 RX ORDER — ACETAMINOPHEN 500 MG
1000 TABLET ORAL
Status: COMPLETED | OUTPATIENT
Start: 2018-01-17 | End: 2018-01-17

## 2018-01-17 RX ADMIN — ACETAMINOPHEN 1000 MG: 500 TABLET ORAL at 12:01

## 2018-01-17 NOTE — ED PROVIDER NOTES
Encounter Date: 1/17/2018       History     Chief Complaint   Patient presents with    URI     been to 3 times for flu coughing up blood streaks     Patient is a 42 year old male with PMHx of hepatitis B. He presents to the ED for URI. Patient seen on 01/08/18 at [facility] and diagnosed with Influenza A. Patient reports compliance with medications and completion of Tamiflu prescription. Reports persistent symptoms of increased cough with blood tinged sputum, chest tightness, SOB, nasal congestion, sore throat, decreased appetite, chills, and trouble sleeping for one week. He denies fever, nausea, vomiting, chest pain, abd pain, dysuria, diarrhea, or constipation. He is a current everyday smoker and reports alcohol use.             Review of patient's allergies indicates:  No Known Allergies  History reviewed. No pertinent past medical history.  History reviewed. No pertinent surgical history.  History reviewed. No pertinent family history.  Social History   Substance Use Topics    Smoking status: Current Every Day Smoker    Smokeless tobacco: Never Used    Alcohol use Yes      Comment: occassional     Review of Systems   Constitutional: Positive for appetite change (decreased) and chills. Negative for fever.   HENT: Positive for congestion and sore throat. Negative for ear pain, sinus pressure, trouble swallowing and voice change.    Respiratory: Positive for cough (productive with blood tinged sputum), chest tightness and shortness of breath.    Cardiovascular: Negative for chest pain.   Gastrointestinal: Negative for abdominal pain, constipation, nausea and vomiting.   Genitourinary: Negative for dysuria.   Musculoskeletal: Negative for back pain.   Skin: Negative for rash.   Neurological: Negative for weakness.   Hematological: Does not bruise/bleed easily.   Psychiatric/Behavioral: Positive for sleep disturbance.       Physical Exam     Initial Vitals [01/17/18 1135]   BP Pulse Resp Temp SpO2   116/66  91 18 100.1 °F (37.8 °C) 96 %      MAP       82.67         Physical Exam    Vitals reviewed.  Constitutional: He appears well-developed and well-nourished.   Patient resting comfortably in NAD on RA.    HENT:   Head: Normocephalic and atraumatic.   Nose: Nose normal. Right sinus exhibits no maxillary sinus tenderness and no frontal sinus tenderness. Left sinus exhibits no maxillary sinus tenderness and no frontal sinus tenderness.   Mouth/Throat: Uvula is midline, oropharynx is clear and moist and mucous membranes are normal. No trismus in the jaw. No uvula swelling. No oropharyngeal exudate, posterior oropharyngeal edema or posterior oropharyngeal erythema.   Eyes: Conjunctivae and EOM are normal. Pupils are equal, round, and reactive to light.   Neck: Normal range of motion.   Cardiovascular: Normal rate and regular rhythm. Exam reveals no friction rub.    No murmur heard.  Pulmonary/Chest: No respiratory distress. He has no wheezes. He has rales.   Abdominal: Soft. Bowel sounds are normal. He exhibits no distension. There is no tenderness.   Musculoskeletal: Normal range of motion.   Neurological: He is alert and oriented to person, place, and time. He has normal strength. No sensory deficit.   Skin: Skin is warm and dry. No erythema.   Psychiatric: He has a normal mood and affect. Thought content normal.         ED Course   Procedures  Labs Reviewed   CBC W/ AUTO DIFFERENTIAL - Abnormal; Notable for the following:        Result Value    WBC 15.74 (*)     MCH 31.1 (*)     Gran # 11.7 (*)     Immature Grans (Abs) 0.06 (*)     Mono # 1.6 (*)     Gran% 74.4 (*)     Lymph% 14.2 (*)     All other components within normal limits   ISTAT PROCEDURE        Imaging Results          X-Ray Chest PA And Lateral (Final result)  Result time 01/17/18 12:57:41    Final result by Jamie Deal MD (01/17/18 12:57:41)                 Impression:        No radiographic acute intrathoracic process seen.      Electronically signed  by: SAUD FIERRO MD, MD  Date:     01/17/18  Time:    12:57              Narrative:    COMPARISON: None    FINDINGS: PA and lateral views of the chest.    Pulmonary vasculature and hilar regions are within normal limits.  The bilateral lungs are symmetrically well expanded and clear.  No pleural effusion or pneumothorax.  The heart and mediastinal contours are within normal limits for age.  Included osseous structures appear intact.                                 Medical Decision Making:   History:   Old Medical Records: I decided to obtain old medical records.  Clinical Tests:   Lab Tests: Ordered and Reviewed  Radiological Study: Ordered and Reviewed  Other:   I have discussed this case with another health care provider.       APC / Resident Notes:   Patient is a 42 year old male with PMHx of hepatitis B. He presents to the ED for URI. Patient is in no acute distress. Vitals stable. AAOx3. RRR. Lungs rales. Abdomen is soft, non tender, non distended. Skin is normal appearance without rashes.     Will order labs and imaging. Will continue to monitor.     Differential diagnoses include, but are not limited to: influenza, post viral pneumonia, bronchitis, or URI.     Leukocytosis 15.74. CXR found to have No radiographic acute intrathoracic process seen Patient reassessed, reports symptoms improved with ED management. I have discussed emergency department findings, and plan with the patient. Will discharge home with F/U with PCP. Patient verbalizes understanding of plan and agrees. Return precautions given.     I have discussed and reviewed with my supervising physician.             Attending Attestation:     Physician Attestation Statement for NP/PA:   I have conducted a face to face encounter with this patient in addition to the NP/PA, due to Medical Complexity    Other NP/PA Attestation Additions:      Medical Decision Making: Post flu cough       Physician Attestation for Scribe:      Comments: I, Dr. Barajas  Yvonne personally performed the services described in this documentation. All medical record entries made by the scribe were at my direction and in my presence.  I have reviewed the chart and agree that the record reflects my personal performance and is accurate and complete. Jenn Russell MD.              ED Course      Clinical Impression:   The encounter diagnosis was Influenza.    Disposition:   Disposition: Discharged  Condition: Stable                        Shirlene Hogan PA-C  01/17/18 2041       Jenn Russell MD  01/17/18 2057

## 2018-01-17 NOTE — ED TRIAGE NOTES
Presents to ER with cough productive of blood tinged sputum..Lungs are clear through out.      Pt identifiers checked and correct  LOC: The patient is awake, alert, aware of environment with an appropriate affect. Oriented x3, speaking appropriately  APPEARANCE: Pt resting comfortably, in no acute distress, pt is clean and well groomed, clothing properly fastened  SKIN: Skin warm, dry and intact, normal skin turgor, moist mucus membranes  RESPIRATORY: Airway is open and patent, respirations are spontaneous, even and unlabored, normal effort and rate  MUSCULOSKELETAL: No obvious deformities.

## 2018-10-21 ENCOUNTER — HOSPITAL ENCOUNTER (EMERGENCY)
Facility: OTHER | Age: 43
Discharge: HOME | End: 2018-10-21
Attending: EMERGENCY MEDICINE
Payer: MEDICAID

## 2018-10-21 VITALS
RESPIRATION RATE: 16 BRPM | HEART RATE: 70 BPM | OXYGEN SATURATION: 98 % | TEMPERATURE: 99 F | HEIGHT: 69 IN | DIASTOLIC BLOOD PRESSURE: 72 MMHG | SYSTOLIC BLOOD PRESSURE: 135 MMHG | WEIGHT: 146 LBS | BODY MASS INDEX: 21.62 KG/M2

## 2018-10-21 DIAGNOSIS — R11.2 NON-INTRACTABLE VOMITING WITH NAUSEA, UNSPECIFIED VOMITING TYPE: Primary | ICD-10-CM

## 2018-10-21 LAB
FLUAV AG SPEC QL IA: NEGATIVE
FLUBV AG SPEC QL IA: NEGATIVE
SPECIMEN SOURCE: NORMAL

## 2018-10-21 PROCEDURE — 96374 THER/PROPH/DIAG INJ IV PUSH: CPT

## 2018-10-21 PROCEDURE — 96375 TX/PRO/DX INJ NEW DRUG ADDON: CPT

## 2018-10-21 PROCEDURE — 25000003 PHARM REV CODE 250: Performed by: EMERGENCY MEDICINE

## 2018-10-21 PROCEDURE — 96361 HYDRATE IV INFUSION ADD-ON: CPT

## 2018-10-21 PROCEDURE — 87400 INFLUENZA A/B EACH AG IA: CPT

## 2018-10-21 PROCEDURE — 99284 EMERGENCY DEPT VISIT MOD MDM: CPT | Mod: 25

## 2018-10-21 PROCEDURE — 63600175 PHARM REV CODE 636 W HCPCS: Performed by: EMERGENCY MEDICINE

## 2018-10-21 RX ORDER — HALOPERIDOL 5 MG/ML
5 INJECTION INTRAMUSCULAR
Status: COMPLETED | OUTPATIENT
Start: 2018-10-21 | End: 2018-10-21

## 2018-10-21 RX ORDER — CIPROFLOXACIN 500 MG/1
500 TABLET ORAL EVERY 12 HOURS
COMMUNITY
Start: 2018-10-21 | End: 2023-01-01 | Stop reason: CLARIF

## 2018-10-21 RX ORDER — DIPHENHYDRAMINE HYDROCHLORIDE 50 MG/ML
25 INJECTION INTRAMUSCULAR; INTRAVENOUS
Status: COMPLETED | OUTPATIENT
Start: 2018-10-21 | End: 2018-10-21

## 2018-10-21 RX ORDER — METRONIDAZOLE 500 MG/1
500 TABLET ORAL 2 TIMES DAILY
COMMUNITY
Start: 2018-10-21 | End: 2023-01-01 | Stop reason: CLARIF

## 2018-10-21 RX ADMIN — DIPHENHYDRAMINE HYDROCHLORIDE 25 MG: 50 INJECTION, SOLUTION INTRAMUSCULAR; INTRAVENOUS at 04:10

## 2018-10-21 RX ADMIN — SODIUM CHLORIDE 1000 ML: 0.9 INJECTION, SOLUTION INTRAVENOUS at 03:10

## 2018-10-21 RX ADMIN — HALOPERIDOL LACTATE 5 MG: 5 INJECTION, SOLUTION INTRAMUSCULAR at 04:10

## 2018-10-21 NOTE — ED NOTES
"Pt eating sandwich calmly in stretcher. Pt states "It feels like my stomach is on fire." Bed locked and in lowest position, side rails x 2, call light in reach. WCTM.  "

## 2018-10-21 NOTE — ED PROVIDER NOTES
Encounter Date: 10/21/2018    SCRIBE #1 NOTE: I, Bashir Emersoneed, am scribing for, and in the presence of, Dr. Fountain.       History     Chief Complaint   Patient presents with    Abdominal Pain     abd pain, back and left flank pain x 5 days with vomiting. Seen at other facilities for same c/o. Pt has prescription for cipro abx. Pt denies compliance. Pt difficult historian.      Time seen by provider: 3:41 PM    This is a 42 y.o. male, with history of hepititis C, who presents with complaint of back and abdominal pain starting five days ago. He also c/o fever, chills, N/V/D, and inability to eat. He denies diaphoresis, rhinorrhea, SOB, CP, constipation, any urinary symptoms, back pain, headache, or weakness. He was discharged today from [facility] and prescribed flagyl, ciprofloxacin, and zofran. He reports starting the antibiotics, but he denies taking the Zofran or any ibuprophen. He also denies recent travel out of the country.      The history is provided by the patient.     Review of patient's allergies indicates:  No Known Allergies  Past Medical History:   Diagnosis Date    Hepatitis C      History reviewed. No pertinent surgical history.  History reviewed. No pertinent family history.  Social History     Tobacco Use    Smoking status: Current Every Day Smoker    Smokeless tobacco: Never Used   Substance Use Topics    Alcohol use: Yes     Comment: occassional    Drug use: No     Review of Systems   Constitutional: Positive for chills and fever. Negative for diaphoresis.   HENT: Negative for rhinorrhea.    Respiratory: Negative for shortness of breath.    Cardiovascular: Negative for chest pain.   Gastrointestinal: Positive for abdominal pain, diarrhea, nausea and vomiting. Negative for constipation.   Genitourinary: Negative for decreased urine volume, difficulty urinating, dysuria, enuresis, frequency, hematuria and urgency.   Musculoskeletal: Positive for back pain. Negative for neck pain.    Skin: Negative for color change, pallor and rash.   Neurological: Negative for weakness and headaches.   Hematological: Negative for adenopathy. Does not bruise/bleed easily.       Physical Exam     Initial Vitals [10/21/18 1530]   BP Pulse Resp Temp SpO2   135/81 72 16 98.4 °F (36.9 °C) 100 %      MAP       --         Physical Exam    Nursing note and vitals reviewed.  Constitutional: He appears well-developed and well-nourished. He is not diaphoretic. He appears distressed.   Tearful. Challenging historian. Moaning. Crying.   HENT:   Head: Normocephalic and atraumatic.   Eyes: Conjunctivae and EOM are normal. No scleral icterus.   Neck: Normal range of motion.   Pulmonary/Chest: No respiratory distress.   Abdominal:   Voluntary guarding to abdomen.   Musculoskeletal: Normal range of motion. He exhibits no edema.   Neurological: He is alert and oriented to person, place, and time.   Skin: Skin is warm and dry. No rash noted. No erythema. No pallor.   Psychiatric: He has a normal mood and affect. His behavior is normal. Judgment and thought content normal.         ED Course   Procedures  Labs Reviewed   INFLUENZA A AND B ANTIGEN   URINALYSIS, REFLEX TO URINE CULTURE   DRUG SCREEN PANEL, URINE EMERGENCY          Imaging Results    None          Medical Decision Making:   Initial Assessment:   Urgent evaluation a 42-year-old gentleman here with complaint of abdominal pain.  Per epic review, patient seen yesterday at [facility] for same complaint, was discharged home with Cipro Flagyl, and Zofran, seen day before at [facility]-stool studies ordered and sent- neg for Shiga, Campylobact, C dif, CT negative and discharged this am after gi cocktail. Pt has had bloodwork 10/20 and 10/21 at [facility] and [facility]. Here pt is emotionally on initial exam, dry mm, tearful though not providing detailed history. Will plan to andmin anxiolysis and reassess. Labs performed twice  within 12 hours prior, and I do not suspect gross changes  since.   Clinical Tests:   Lab Tests: Ordered and Reviewed  ED Management:  5:02 PM - Feeling much improved. Able to tolerate a sandwich, no longer tearful, calmed and ready for dc home. Agrees to continue bland diet and fu PCP.             Scribe Attestation:   Scribe #1: I performed the above scribed service and the documentation accurately describes the services I performed. I attest to the accuracy of the note.    Attending Attestation:           Physician Attestation for Scribe:  Physician Attestation Statement for Scribe #1: I, Dr. Fountain, reviewed documentation, as scribed by Bashir Rosas in my presence, and it is both accurate and complete.                    Clinical Impression:     1. Non-intractable vomiting with nausea, unspecified vomiting type            Disposition:   Disposition: Discharged  Condition: Stable                        Becca Fountain MD  10/21/18 1076

## 2018-10-21 NOTE — ED TRIAGE NOTES
Pt presents to ED with c/o generalized abdominal pain, left flank pain and N/V x 5 days. Pt was seen today and DC from [facility], also seen yesterday at [facility] and 10/19 at [facility] for same s/s. Pt was given Cipro and Flagyl Rx, states he took Cipro but not Flagyl. Pt unsure if he has taken Zofran for nausea that was prescribed. Pt is poor historian. Pt c/o 10/10 pain, writhing around in bed, crying. Pt denies diarrhea, blood in vomit or stool.

## 2018-10-21 NOTE — ED NOTES
Pt ambulated to bathroom with steady gait. Pt returned to room and reconnected to continuous pulse ox and BP monitoring as well as IV fluids.

## 2018-10-21 NOTE — ED NOTES
Pt reports he has someone to drive him home. Pt counseled on effects of ordered medications, states his friend drove him here and will be driving him home.

## 2021-12-22 NOTE — PROGRESS NOTES
1:35 PM called with no answer. SONDRA RUSS
9:47 AM treated in the ED. Called patient with no answer. SONDRA RUSS
.

## 2023-01-01 ENCOUNTER — HOSPITAL ENCOUNTER (EMERGENCY)
Facility: OTHER | Age: 48
Discharge: HOME | End: 2023-01-02
Attending: EMERGENCY MEDICINE
Payer: MEDICAID

## 2023-01-01 DIAGNOSIS — K62.89 PROCTITIS: ICD-10-CM

## 2023-01-01 DIAGNOSIS — K62.5 RECTAL BLEEDING: Primary | ICD-10-CM

## 2023-01-01 LAB
ALBUMIN SERPL BCP-MCNC: 3.5 G/DL (ref 3.5–5.2)
ALP SERPL-CCNC: 89 U/L (ref 55–135)
ALT SERPL W/O P-5'-P-CCNC: 14 U/L (ref 10–44)
ANION GAP SERPL CALC-SCNC: 10 MMOL/L (ref 8–16)
AST SERPL-CCNC: 18 U/L (ref 10–40)
BASOPHILS # BLD AUTO: 0.03 K/UL (ref 0–0.2)
BASOPHILS NFR BLD: 0.2 % (ref 0–1.9)
BILIRUB SERPL-MCNC: 0.4 MG/DL (ref 0.1–1)
BUN SERPL-MCNC: 11 MG/DL (ref 6–20)
CALCIUM SERPL-MCNC: 9 MG/DL (ref 8.7–10.5)
CHLORIDE SERPL-SCNC: 105 MMOL/L (ref 95–110)
CO2 SERPL-SCNC: 22 MMOL/L (ref 23–29)
CREAT SERPL-MCNC: 0.9 MG/DL (ref 0.5–1.4)
DIFFERENTIAL METHOD: ABNORMAL
EOSINOPHIL # BLD AUTO: 0.1 K/UL (ref 0–0.5)
EOSINOPHIL NFR BLD: 1.1 % (ref 0–8)
ERYTHROCYTE [DISTWIDTH] IN BLOOD BY AUTOMATED COUNT: 12.2 % (ref 11.5–14.5)
EST. GFR  (NO RACE VARIABLE): >60 ML/MIN/1.73 M^2
GLUCOSE SERPL-MCNC: 103 MG/DL (ref 70–110)
HCT VFR BLD AUTO: 41.1 % (ref 40–54)
HGB BLD-MCNC: 14.1 G/DL (ref 14–18)
HIV 1+2 AB+HIV1 P24 AG SERPL QL IA: NEGATIVE
IMM GRANULOCYTES # BLD AUTO: 0.02 K/UL (ref 0–0.04)
IMM GRANULOCYTES NFR BLD AUTO: 0.2 % (ref 0–0.5)
INR PPP: 1 (ref 0.8–1.2)
LYMPHOCYTES # BLD AUTO: 2.3 K/UL (ref 1–4.8)
LYMPHOCYTES NFR BLD: 18.9 % (ref 18–48)
MCH RBC QN AUTO: 33.7 PG (ref 27–31)
MCHC RBC AUTO-ENTMCNC: 34.3 G/DL (ref 32–36)
MCV RBC AUTO: 98 FL (ref 82–98)
MONOCYTES # BLD AUTO: 0.8 K/UL (ref 0.3–1)
MONOCYTES NFR BLD: 6.7 % (ref 4–15)
NEUTROPHILS # BLD AUTO: 8.8 K/UL (ref 1.8–7.7)
NEUTROPHILS NFR BLD: 72.9 % (ref 38–73)
NRBC BLD-RTO: 0 /100 WBC
PLATELET # BLD AUTO: 277 K/UL (ref 150–450)
PMV BLD AUTO: 9.3 FL (ref 9.2–12.9)
POTASSIUM SERPL-SCNC: 3.3 MMOL/L (ref 3.5–5.1)
PROT SERPL-MCNC: 7.5 G/DL (ref 6–8.4)
PROTHROMBIN TIME: 10.6 SEC (ref 9–12.5)
RBC # BLD AUTO: 4.19 M/UL (ref 4.6–6.2)
SODIUM SERPL-SCNC: 137 MMOL/L (ref 136–145)
WBC # BLD AUTO: 12.1 K/UL (ref 3.9–12.7)

## 2023-01-01 PROCEDURE — 99284 EMERGENCY DEPT VISIT MOD MDM: CPT | Mod: 25

## 2023-01-01 PROCEDURE — 85610 PROTHROMBIN TIME: CPT | Performed by: EMERGENCY MEDICINE

## 2023-01-01 PROCEDURE — 25000003 PHARM REV CODE 250: Performed by: PHYSICIAN ASSISTANT

## 2023-01-01 PROCEDURE — 80053 COMPREHEN METABOLIC PANEL: CPT | Performed by: EMERGENCY MEDICINE

## 2023-01-01 PROCEDURE — 87389 HIV-1 AG W/HIV-1&-2 AB AG IA: CPT | Performed by: EMERGENCY MEDICINE

## 2023-01-01 PROCEDURE — 63600175 PHARM REV CODE 636 W HCPCS: Performed by: EMERGENCY MEDICINE

## 2023-01-01 PROCEDURE — 25000003 PHARM REV CODE 250: Performed by: EMERGENCY MEDICINE

## 2023-01-01 PROCEDURE — 86592 SYPHILIS TEST NON-TREP QUAL: CPT | Performed by: EMERGENCY MEDICINE

## 2023-01-01 PROCEDURE — 96365 THER/PROPH/DIAG IV INF INIT: CPT

## 2023-01-01 PROCEDURE — 85025 COMPLETE CBC W/AUTO DIFF WBC: CPT | Performed by: EMERGENCY MEDICINE

## 2023-01-01 RX ORDER — SILDENAFIL CITRATE 100 MG/1
100 TABLET, FILM COATED ORAL DAILY PRN
COMMUNITY
Start: 2022-10-13

## 2023-01-01 RX ORDER — ACETAMINOPHEN 500 MG
1000 TABLET ORAL
Status: COMPLETED | OUTPATIENT
Start: 2023-01-01 | End: 2023-01-01

## 2023-01-01 RX ORDER — DOXYCYCLINE HYCLATE 100 MG
100 TABLET ORAL
Status: COMPLETED | OUTPATIENT
Start: 2023-01-01 | End: 2023-01-01

## 2023-01-01 RX ADMIN — DOXYCYCLINE HYCLATE 100 MG: 100 TABLET, COATED ORAL at 10:01

## 2023-01-01 RX ADMIN — ACETAMINOPHEN 1000 MG: 500 TABLET, FILM COATED ORAL at 07:01

## 2023-01-01 RX ADMIN — CEFTRIAXONE 1 G: 1 INJECTION, SOLUTION INTRAVENOUS at 10:01

## 2023-01-01 RX ADMIN — SODIUM CHLORIDE 1000 ML: 0.9 INJECTION, SOLUTION INTRAVENOUS at 10:01

## 2023-01-01 NOTE — Clinical Note
"Neo Bloom" Bo was seen and treated in our emergency department on 1/1/2023.  He may return to work on 01/03/2023.       If you have any questions or concerns, please don't hesitate to call.      Alyson QUIGLEY    "

## 2023-01-01 NOTE — FIRST PROVIDER EVALUATION
Medical screening examination initiated.  I have conducted a focused provider triage encounter, findings are as follows:    Brief history of present illness: rectal pain, constipation and BRBRR x 3 days. Has tried suppository with no improvement. Denies any fever chills or additional GI symptoms.     There were no vitals filed for this visit.    Pertinent physical exam:  well appearing in no distress    Brief workup plan:  exam in the back    Preliminary workup initiated; this workup will be continued and followed by the physician or advanced practice provider that is assigned to the patient when roomed.

## 2023-01-02 VITALS
RESPIRATION RATE: 18 BRPM | SYSTOLIC BLOOD PRESSURE: 120 MMHG | HEART RATE: 82 BPM | HEIGHT: 68 IN | WEIGHT: 150 LBS | BODY MASS INDEX: 22.73 KG/M2 | OXYGEN SATURATION: 97 % | DIASTOLIC BLOOD PRESSURE: 78 MMHG | TEMPERATURE: 98 F

## 2023-01-02 LAB
BILIRUB UR QL STRIP: NEGATIVE
C TRACH DNA SPEC QL NAA+PROBE: NOT DETECTED
CLARITY UR: CLEAR
COLOR UR: YELLOW
GLUCOSE UR QL STRIP: NEGATIVE
HGB UR QL STRIP: NEGATIVE
KETONES UR QL STRIP: ABNORMAL
LEUKOCYTE ESTERASE UR QL STRIP: NEGATIVE
N GONORRHOEA DNA SPEC QL NAA+PROBE: NOT DETECTED
NITRITE UR QL STRIP: NEGATIVE
PH UR STRIP: 6 [PH] (ref 5–8)
PROT UR QL STRIP: NEGATIVE
SP GR UR STRIP: 1.01 (ref 1–1.03)
URN SPEC COLLECT METH UR: ABNORMAL
UROBILINOGEN UR STRIP-ACNC: NEGATIVE EU/DL

## 2023-01-02 PROCEDURE — 87591 N.GONORRHOEAE DNA AMP PROB: CPT | Performed by: EMERGENCY MEDICINE

## 2023-01-02 PROCEDURE — 81003 URINALYSIS AUTO W/O SCOPE: CPT | Performed by: EMERGENCY MEDICINE

## 2023-01-02 PROCEDURE — 87491 CHLMYD TRACH DNA AMP PROBE: CPT | Performed by: EMERGENCY MEDICINE

## 2023-01-02 RX ORDER — DOXYCYCLINE 100 MG/1
100 CAPSULE ORAL 2 TIMES DAILY
Qty: 13 CAPSULE | Refills: 0 | Status: SHIPPED | OUTPATIENT
Start: 2023-01-02 | End: 2023-01-09

## 2023-01-02 NOTE — ED NOTES
Pt sitting up in bed, call light next to pt. Pt instructed to ask for assistance, verbalizes understanding. Will continue to monitor the pt while in the ER.

## 2023-01-02 NOTE — ED NOTES
Pt aware urine sample is still needed, instructed to let the RN know if he can provide a sample. Pt verbalizes understanding.

## 2023-01-02 NOTE — ED TRIAGE NOTES
Pt reports being constipated x3-4 days, reports straining to have a BM. Pt reports noticing the blood a day after the constipation started. Pt reports using OTC glycerin suppositories w/o relief. Pt reports rectal pain 8/10 at this time. PAULIE NUÑEZ.

## 2023-01-02 NOTE — ED PROVIDER NOTES
"Encounter Date: 1/1/2023    SCRIBE #1 NOTE: I, Julio C Mclaughlinbaum, am scribing for, and in the presence of,  Abimael Oneill MD. I have scribed the following portions of the note - Other sections scribed: HPI, ROS, PE.     History     Chief Complaint   Patient presents with    Rectal Bleeding     Pt reports straining with bm, noticing bright red blood to stool and upon wiping x3 days,c/o constipation      Time seen by provider: 7:43 PM    This is a 47 y.o. male with chronic Hep B who presents with complaint of rectal bleeding starting 5 days ago. He reports that he first noticed the bleeding after wiping five days ago. Patient states that he "feels the need to use the bathroom" but only produces "bloody mucous" in place of stool frequently throughout the day. He notes that he typically has a bowel movement every other day, no prior similar episodes. Patient notes that he participated in receptive anal intercourse two weeks ago with protection. He reports that he is "scared to eat" due to the subsequent urge to have BM but no nausea or emesis. Patient denies fever, abdominal pain, dysuria, or other urinary issues. He notes that he packs paper towels within his underwear due to dripping throughout the day. Patient denies history of STDs.     The history is provided by the patient.   Review of patient's allergies indicates:  No Known Allergies  Past Medical History:   Diagnosis Date    Hepatitis C      History reviewed. No pertinent surgical history.  History reviewed. No pertinent family history.  Social History     Tobacco Use    Smoking status: Former     Types: Cigarettes    Smokeless tobacco: Never   Substance Use Topics    Alcohol use: Yes     Comment: occassional    Drug use: Yes     Types: Marijuana     Review of Systems   Constitutional:  Positive for appetite change. Negative for fever.   HENT:  Negative for congestion.    Eyes:  Negative for redness.   Respiratory:  Negative for shortness of breath.  "   Cardiovascular:  Negative for chest pain.   Gastrointestinal:  Positive for anal bleeding and constipation. Negative for abdominal pain.   Genitourinary:  Negative for difficulty urinating, dysuria and frequency.   Skin:  Negative for rash.   Neurological:  Negative for headaches.   Psychiatric/Behavioral:  Negative for confusion.      Physical Exam     Initial Vitals [01/01/23 1457]   BP Pulse Resp Temp SpO2   129/77 89 18 98.7 °F (37.1 °C) 98 %      MAP       --         Physical Exam    Nursing note and vitals reviewed.  Constitutional: He appears well-developed and well-nourished. He is not diaphoretic. No distress.   HENT:   Head: Normocephalic and atraumatic.   Eyes: Conjunctivae are normal. No scleral icterus.   Neck: Neck supple.   Cardiovascular:  Normal rate, regular rhythm, normal heart sounds and intact distal pulses.           No murmur heard.  Pulmonary/Chest: Breath sounds normal. No respiratory distress. He has no wheezes. He has no rhonchi. He has no rales.   Abdominal: Abdomen is soft. There is no abdominal tenderness. There is no rebound and no guarding.   Genitourinary:    Genitourinary Comments: No external hemorrhoids or active discharge. Mild rectal tenderness.     Musculoskeletal:         General: No edema.      Cervical back: Neck supple.     Neurological: He is alert and oriented to person, place, and time.   Skin: Skin is warm and dry.   Psychiatric: He has a normal mood and affect.       ED Course   Procedures  Labs Reviewed   CBC W/ AUTO DIFFERENTIAL - Abnormal; Notable for the following components:       Result Value    RBC 4.19 (*)     MCH 33.7 (*)     Gran # (ANC) 8.8 (*)     All other components within normal limits    Narrative:     Release to patient->Immediate   URINALYSIS, REFLEX TO URINE CULTURE - Abnormal; Notable for the following components:    Ketones, UA 1+ (*)     All other components within normal limits    Narrative:     Specimen Source->Urine   COMPREHENSIVE METABOLIC  PANEL - Abnormal; Notable for the following components:    Potassium 3.3 (*)     CO2 22 (*)     All other components within normal limits   C. TRACHOMATIS/N. GONORRHOEAE BY AMP DNA    Narrative:     Sources by Resulting Lab:->[facility]  Release to patient->Immediate   HIV 1 / 2 ANTIBODY    Narrative:     Release to patient->Immediate   PROTIME-INR   RPR          Imaging Results    None          Medications   acetaminophen tablet 1,000 mg (1,000 mg Oral Given 1/1/23 1925)   sodium chloride 0.9% bolus 1,000 mL 1,000 mL (0 mLs Intravenous Stopped 1/1/23 2351)   cefTRIAXone (ROCEPHIN) 1 g/50 mL D5W IVPB (0 g Intravenous Stopped 1/1/23 2323)   doxycycline tablet 100 mg (100 mg Oral Given 1/1/23 2252)     Medical Decision Making:   History:   Old Medical Records: I decided to obtain old medical records.  Initial Assessment:       47-year-old male with history of chronic hep B presents for evaluation of bloody stools that have been ongoing for 5 days.  He reports urge to have BM and bloody mucus output, but no stool for the past 5 days, whereas he normally has a normal BM every 2 days.  No history of similar previous episodes or known hemorrhoids.  Patient states he has bloody mucus leakage throughout the day, but denies any urinary symptoms or abdominal pain, no fevers.  He had receptive anal intercourse 2 weeks ago but did use protection, denies any history of STDs but per chart review has had syphilis and gonorrhea in the past.  Concern for proctitis given risk factors and description of symptoms, will do STD testing and check basic labs.  Rectal exam with no anal discharge or hemorrhoids/fissure, but he did have tenderness with rectal exam.    Initial workup with WBC 12, no other acute findings; no elevated INR or LFTs, no thrombocytopenia related to hep B infection.  UA with no evidence for infection.  I discussed with patient doing a CT to further evaluate for suspected proctitis, but he refuses, stating that he does  not want to stay in the ED that long.  Given concern for STD related proctitis, will treat empirically with ceftriaxone and start doxycycline pending cultures.  Patient advised on close PCP follow-up for full STD testing; rapid HIV negative in ED, and RPR still pending.  Patient understands return to the ED for any worsening rectal bleeding or development of other concerning symptoms such as abdominal pain, fevers.      Clinical Tests:   Lab Tests: Ordered and Reviewed        Scribe Attestation:   Scribe #1: I performed the above scribed service and the documentation accurately describes the services I performed. I attest to the accuracy of the note.            I reviewed the patient presentation with the mid-level provider and agree with ED workup and management. I was available to personally examine the patient if requested.         Clinical Impression:   Final diagnoses:  [K62.5] Rectal bleeding (Primary)  [K62.89] Proctitis        ED Disposition Condition    Discharge Stable          ED Prescriptions       Medication Sig Dispense Start Date End Date Auth. Provider    doxycycline (VIBRAMYCIN) 100 MG Cap Take 1 capsule (100 mg total) by mouth 2 (two) times daily. for 7 days 13 capsule 1/2/2023 1/9/2023 Abimael Oneill MD          Follow-up Information       Follow up With Specialties Details Why Contact Info    [facility] - Emergency Dept Emergency Medicine Go to  If symptoms worsen [address]  [place] [id number] [phone]             Abimael Oneill MD  01/02/23 7949

## 2023-01-02 NOTE — ED NOTES
Pt sitting in bed, AAOx4, NADN at this time. Pt instructed to ask for assistance if needed, verbalizes understanding. Will continue to monitor the pt while in the ER.

## 2023-01-03 LAB — RPR SER QL: NORMAL
